# Patient Record
Sex: MALE | Race: WHITE | NOT HISPANIC OR LATINO | Employment: OTHER | URBAN - METROPOLITAN AREA
[De-identification: names, ages, dates, MRNs, and addresses within clinical notes are randomized per-mention and may not be internally consistent; named-entity substitution may affect disease eponyms.]

---

## 2018-08-29 ENCOUNTER — HOSPITAL ENCOUNTER (EMERGENCY)
Facility: HOSPITAL | Age: 73
Discharge: HOME | End: 2018-08-29
Attending: EMERGENCY MEDICINE | Admitting: EMERGENCY MEDICINE
Payer: COMMERCIAL

## 2018-08-29 ENCOUNTER — APPOINTMENT (EMERGENCY)
Dept: RADIOLOGY | Facility: HOSPITAL | Age: 73
End: 2018-08-29
Payer: COMMERCIAL

## 2018-08-29 VITALS
TEMPERATURE: 98.6 F | HEART RATE: 66 BPM | DIASTOLIC BLOOD PRESSURE: 97 MMHG | RESPIRATION RATE: 18 BRPM | SYSTOLIC BLOOD PRESSURE: 143 MMHG | HEIGHT: 72 IN | WEIGHT: 270 LBS | BODY MASS INDEX: 36.57 KG/M2 | OXYGEN SATURATION: 98 %
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
## 2018-08-29 DIAGNOSIS — S30.1XXA TRAUMATIC ECCHYMOSIS OF ABDOMINAL WALL, INITIAL ENCOUNTER: ICD-10-CM

## 2018-08-29 DIAGNOSIS — M79.622 LEFT UPPER ARM PAIN: ICD-10-CM

## 2018-08-29 DIAGNOSIS — R10.9 RIGHT SIDED ABDOMINAL PAIN: Primary | ICD-10-CM

## 2018-08-29 LAB
ANION GAP BLD CALC-SCNC: 18 MMOL/L (ref 4–13)
BUN BLD-MCNC: 14 MG/DL (ref 5–25)
CA-I BLD-SCNC: 1.13 MMOL/L (ref 1.12–1.32)
CHLORIDE BLD-SCNC: 103 MMOL/L (ref 100–108)
CREAT BLD-MCNC: 1 MG/DL (ref 0.6–1.3)
GFR SERPL CREATININE-BSD FRML MDRD: 75 ML/MIN/1.73SQ M
GLUCOSE SERPL-MCNC: 211 MG/DL (ref 65–140)
HCT VFR BLD CALC: 44 % (ref 36.5–49.3)
HGB BLDA-MCNC: 15 G/DL (ref 12–17)
PCO2 BLD: 24 MMOL/L (ref 21–32)
POTASSIUM BLD-SCNC: 4.1 MMOL/L (ref 3.5–5.3)
SODIUM BLD-SCNC: 140 MMOL/L (ref 136–145)
SPECIMEN SOURCE: ABNORMAL

## 2018-08-29 PROCEDURE — 73060 X-RAY EXAM OF HUMERUS: CPT

## 2018-08-29 PROCEDURE — 99285 EMERGENCY DEPT VISIT HI MDM: CPT

## 2018-08-29 PROCEDURE — 70450 CT HEAD/BRAIN W/O DYE: CPT

## 2018-08-29 PROCEDURE — 85014 HEMATOCRIT: CPT

## 2018-08-29 PROCEDURE — 74177 CT ABD & PELVIS W/CONTRAST: CPT

## 2018-08-29 PROCEDURE — 80047 BASIC METABLC PNL IONIZED CA: CPT

## 2018-08-29 RX ORDER — CLOPIDOGREL BISULFATE 75 MG/1
75 TABLET ORAL DAILY
COMMUNITY

## 2018-08-29 RX ADMIN — IOHEXOL 100 ML: 350 INJECTION, SOLUTION INTRAVENOUS at 10:40
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
## 2021-01-28 DIAGNOSIS — Z23 ENCOUNTER FOR IMMUNIZATION: ICD-10-CM

## 2022-09-02 ENCOUNTER — TELEPHONE (OUTPATIENT)
Dept: GASTROENTEROLOGY | Facility: CLINIC | Age: 77
End: 2022-09-02

## 2022-09-02 NOTE — TELEPHONE ENCOUNTER
Patients GI provider:  Pt seen Dr Jay more, Dr Ebenezer Saucedo and Dr Jeanie Nair    Number to return call: [phone])    Reason for call: Pt calling because he received letter to call and schedule his colonoscopy  Please call to schedule  Thank you!     Scheduled procedure/appointment date if applicable: Apt/procedure

## 2022-09-07 ENCOUNTER — TELEPHONE (OUTPATIENT)
Dept: GASTROENTEROLOGY | Facility: CLINIC | Age: 77
End: 2022-09-07

## 2022-09-07 ENCOUNTER — OFFICE VISIT (OUTPATIENT)
Dept: GASTROENTEROLOGY | Facility: CLINIC | Age: 77
End: 2022-09-07
Payer: MEDICARE

## 2022-09-07 VITALS
HEART RATE: 71 BPM | BODY MASS INDEX: 35.21 KG/M2 | HEIGHT: 72 IN | SYSTOLIC BLOOD PRESSURE: 118 MMHG | DIASTOLIC BLOOD PRESSURE: 78 MMHG | WEIGHT: 260 LBS

## 2022-09-07 DIAGNOSIS — K63.5 HYPERPLASTIC COLONIC POLYP, UNSPECIFIED PART OF COLON: Primary | ICD-10-CM

## 2022-09-07 DIAGNOSIS — I25.10 CORONARY ARTERY DISEASE INVOLVING NATIVE CORONARY ARTERY OF NATIVE HEART WITHOUT ANGINA PECTORIS: ICD-10-CM

## 2022-09-07 PROBLEM — T83.113A: Status: ACTIVE | Noted: 2022-09-07

## 2022-09-07 PROBLEM — T83.113A: Status: RESOLVED | Noted: 2022-09-07 | Resolved: 2022-09-07

## 2022-09-07 PROCEDURE — 99203 OFFICE O/P NEW LOW 30 MIN: CPT | Performed by: NURSE PRACTITIONER

## 2022-09-07 RX ORDER — ROSUVASTATIN CALCIUM 20 MG/1
TABLET, COATED ORAL
COMMUNITY
Start: 2022-08-17

## 2022-09-07 RX ORDER — LISINOPRIL 10 MG/1
TABLET ORAL
COMMUNITY
Start: 2009-09-06

## 2022-09-07 RX ORDER — ASPIRIN 81 MG/1
81 TABLET ORAL
COMMUNITY
End: 2022-09-07

## 2022-09-07 RX ORDER — CARVEDILOL 6.25 MG/1
TABLET ORAL
COMMUNITY
Start: 2022-08-25

## 2022-09-07 NOTE — LETTER
September 7, 2022     MD Reji Brody  [address] [address]    Patient: Yesika Abrams   YOB: 1945   Date of Visit: 9/7/2022       Dear Dr Lucas Jaimes: Thank you for referring Yesika Abrams to me for evaluation  Below are my notes for this consultation  If you have questions, please do not hesitate to call me  I look forward to following your patient along with you  Sincerely,        REMI Alfonso        CC: No Recipients  REMI Alfonso  9/7/2022  4:53 PM  Sign when Signing Visit    [address] [facility] - Outpatient Consultation  Yesika Abrams 68 y o  male MRN: [id number]  Encounter: [id number]    ASSESSMENT AND PLAN:      1  History of colon polyp  Prior colonoscopy 05/2017-multiple hyperplastic polyps excised  Prior colon polyps  5 year recall recommended, Overdue for surveillance  No recent acute change in bowel habits, no alarm symptoms  -scheduled for colonoscopy at [facility]    2  Coronary artery disease involving native coronary artery of native heart without angina pectoris  History of CAD with cardiac stents 9 years ago  Stable cardiac status  Denies recent angina or dyspnea  Currently takes clopidogrel 75 mg daily for anti-platelet therapy  Discussed risk of bleeding if colonoscopy performed on clopidogrel  Also discussed risks of thrombus formation with holding clopidogrel prior to colonoscopy  Patient understands and accepts risks  Has interrupted anti-platelet therapy previously without complication  -instructed patient to hold clopidogrel 5 days prior to colonoscopy  -will confer with patient's cardiologist to ensure no further recommendations or instructions needed prior to holding clopidogrel    Followup Appointment:  As needed  ______________________________________________________________________    Chief Complaint   Patient presents with    Clearance for colonoscopy   PT is on Plavix' HPI:   Haidee Schilder is a 68y o  year old male who presents to schedule surveillance colonoscopy  Prior colonoscopy 05/2017-3 hyperplastic polyps excised  Five year recall recommended/2022  Patient has prior history of colon polyp  He denies recent change in bowel habits-formed bowel movement daily  Denies abdominal or rectal pain  No melena or rectal bleeding    He has a history of CAD and reports remote cardiac stent 9 years ago  Denies recent chest pain, dyspnea or palpitations  Currently takes clopidogrel daily    Historical Information   Past Medical History:   Diagnosis Date    Cardiac disease     Colon polyp      Past Surgical History:   Procedure Laterality Date    COLONOSCOPY       Social History     Substance and Sexual Activity   Alcohol Use No     Social History     Substance and Sexual Activity   Drug Use No     Social History     Tobacco Use   Smoking Status Current Every Day Smoker    Packs/day: 0 50   Smokeless Tobacco Never Used     Family History   Problem Relation Age of Onset    Colon cancer Neg Hx     Colon polyps Neg Hx        Meds/Allergies     Current Outpatient Medications:     carvedilol (COREG) 6 25 mg tablet    clopidogrel (PLAVIX) 75 mg tablet    lisinopril (ZESTRIL) 10 mg tablet    metFORMIN (GLUCOPHAGE) 500 mg tablet    rosuvastatin (CRESTOR) 20 MG tablet    No Known Allergies    PHYSICAL EXAM:    Blood pressure 118/78, pulse 71, height 6' (1 829 m), weight 118 kg (260 lb)  Body mass index is 35 26 kg/m²  General Appearance: NAD, cooperative, alert  Eyes: Anicteric  ENT:  Normocephalic, atraumatic, normal mucosa  Neck:  Supple, symmetrical, trachea midline,   Resp:  Clear to auscultation bilaterally; no rales, rhonchi or wheezing; respirations unlabored   CV:  S1 S2, Regular rate and rhythm; no murmur, rub, or gallop    GI:  Soft, non-tender, non-distended; normal bowel sounds; no masses, no organomegaly   Rectal: Deferred  Musculoskeletal: No cyanosis, clubbing or edema  Normal ROM  Skin:  No jaundice, rashes, or lesions   Psych: Normal affect, good eye contact  Neuro: No gross deficits, AAOx3    Lab Results:   Lab Results   Component Value Date    HGB 15 0 08/29/2018    HCT 44 08/29/2018     Lab Results   Component Value Date    CO2 24 08/29/2018    GLUCOSE 211 (H) 08/29/2018    EGFR 75 08/29/2018           REVIEW OF SYSTEMS:    CONSTITUTIONAL: Denies any fever, chills, rigors, and weight loss  HEENT: No earache or tinnitus  Denies hearing loss or visual disturbances  CARDIOVASCULAR: No chest pain or palpitations  RESPIRATORY: Denies any cough, hemoptysis, shortness of breath or dyspnea on exertion  GASTROINTESTINAL: As noted in the History of Present Illness  GENITOURINARY: No problems with urination  Denies any hematuria or dysuria  NEUROLOGIC: No dizziness or vertigo, denies headaches  MUSCULOSKELETAL: Denies any muscle or joint pain  SKIN: Denies skin rashes or itching  ENDOCRINE: Denies excessive thirst  Denies intolerance to heat or cold  PSYCHOSOCIAL: Denies depression or anxiety  Denies any recent memory loss

## 2022-09-07 NOTE — PROGRESS NOTES
[address] [facility] - Outpatient Consultation  Shahla Prado 68 y o  male MRN: [id number]  Encounter: [id number]    ASSESSMENT AND PLAN:      1  History of colon polyp  Prior colonoscopy 05/2017-multiple hyperplastic polyps excised  Prior colon polyps  5 year recall recommended, Overdue for surveillance  No recent acute change in bowel habits, no alarm symptoms  -scheduled for colonoscopy at [facility]    2  Coronary artery disease involving native coronary artery of native heart without angina pectoris  History of CAD with cardiac stents 9 years ago  Stable cardiac status  Denies recent angina or dyspnea  Currently takes clopidogrel 75 mg daily for anti-platelet therapy  Discussed risk of bleeding if colonoscopy performed on clopidogrel  Also discussed risks of thrombus formation with holding clopidogrel prior to colonoscopy  Patient understands and accepts risks  Has interrupted anti-platelet therapy previously without complication  -instructed patient to hold clopidogrel 5 days prior to colonoscopy  -will confer with patient's cardiologist to ensure no further recommendations or instructions needed prior to holding clopidogrel    Followup Appointment:  As needed  ______________________________________________________________________    Chief Complaint   Patient presents with    Clearance for colonoscopy  PT is on Plavix'       HPI:   Shahla Prado is a 68y o  year old male who presents to schedule surveillance colonoscopy  Prior colonoscopy 05/2017-3 hyperplastic polyps excised  Five year recall recommended/2022  Patient has prior history of colon polyp  He denies recent change in bowel habits-formed bowel movement daily  Denies abdominal or rectal pain  No melena or rectal bleeding    He has a history of CAD and reports remote cardiac stent 9 years ago  Denies recent chest pain, dyspnea or palpitations    Currently takes clopidogrel daily    Historical Information Past Medical History:   Diagnosis Date    Cardiac disease     Colon polyp      Past Surgical History:   Procedure Laterality Date    COLONOSCOPY       Social History     Substance and Sexual Activity   Alcohol Use No     Social History     Substance and Sexual Activity   Drug Use No     Social History     Tobacco Use   Smoking Status Current Every Day Smoker    Packs/day: 0 50   Smokeless Tobacco Never Used     Family History   Problem Relation Age of Onset    Colon cancer Neg Hx     Colon polyps Neg Hx        Meds/Allergies     Current Outpatient Medications:     carvedilol (COREG) 6 25 mg tablet    clopidogrel (PLAVIX) 75 mg tablet    lisinopril (ZESTRIL) 10 mg tablet    metFORMIN (GLUCOPHAGE) 500 mg tablet    rosuvastatin (CRESTOR) 20 MG tablet    No Known Allergies    PHYSICAL EXAM:    Blood pressure 118/78, pulse 71, height 6' (1 829 m), weight 118 kg (260 lb)  Body mass index is 35 26 kg/m²  General Appearance: NAD, cooperative, alert  Eyes: Anicteric  ENT:  Normocephalic, atraumatic, normal mucosa  Neck:  Supple, symmetrical, trachea midline,   Resp:  Clear to auscultation bilaterally; no rales, rhonchi or wheezing; respirations unlabored   CV:  S1 S2, Regular rate and rhythm; no murmur, rub, or gallop  GI:  Soft, non-tender, non-distended; normal bowel sounds; no masses, no organomegaly   Rectal: Deferred  Musculoskeletal: No cyanosis, clubbing or edema  Normal ROM  Skin:  No jaundice, rashes, or lesions   Psych: Normal affect, good eye contact  Neuro: No gross deficits, AAOx3    Lab Results:   Lab Results   Component Value Date    HGB 15 0 08/29/2018    HCT 44 08/29/2018     Lab Results   Component Value Date    CO2 24 08/29/2018    GLUCOSE 211 (H) 08/29/2018    EGFR 75 08/29/2018           REVIEW OF SYSTEMS:    CONSTITUTIONAL: Denies any fever, chills, rigors, and weight loss  HEENT: No earache or tinnitus  Denies hearing loss or visual disturbances    CARDIOVASCULAR: No chest pain or palpitations  RESPIRATORY: Denies any cough, hemoptysis, shortness of breath or dyspnea on exertion  GASTROINTESTINAL: As noted in the History of Present Illness  GENITOURINARY: No problems with urination  Denies any hematuria or dysuria  NEUROLOGIC: No dizziness or vertigo, denies headaches  MUSCULOSKELETAL: Denies any muscle or joint pain  SKIN: Denies skin rashes or itching  ENDOCRINE: Denies excessive thirst  Denies intolerance to heat or cold  PSYCHOSOCIAL: Denies depression or anxiety  Denies any recent memory loss

## 2022-09-07 NOTE — TELEPHONE ENCOUNTER
Scheduled date of colonoscopy (as of today):10/14//22  Physician performing colonoscopy:Dr Tiffanie Olson  Location of colonoscopy:[facility]  Bowel prep reviewed with patient:Jackie  Instructions reviewed with patient by: Elis Shaffer  Clearances: Yes-Plavix

## 2022-09-19 NOTE — TELEPHONE ENCOUNTER
Clearance received and scanned to chart  LVM advising patient of receipt of clearance for 5 day Plavix hold  Last dose will be 10/8/22  Asked that he call to confirm receipt

## 2022-10-03 ENCOUNTER — TELEPHONE (OUTPATIENT)
Dept: GASTROENTEROLOGY | Facility: CLINIC | Age: 77
End: 2022-10-03

## 2022-10-03 NOTE — TELEPHONE ENCOUNTER
Patients GI provider:  Dr Chelsi Ortega    Number to return call: [phone]    Reason for call: Pt calling to reschedule his procedure to the following week if possible      Scheduled procedure/appointment date if applicable: Apt/procedure 10/14/22

## 2022-10-20 ENCOUNTER — TELEPHONE (OUTPATIENT)
Dept: GASTROENTEROLOGY | Facility: CLINIC | Age: 77
End: 2022-10-20

## 2022-10-20 NOTE — TELEPHONE ENCOUNTER
Left message for patient to call back to go over golyte instructions for 10/31 procedure  Also remind him to stop Plavix 5 days prior to procedure

## 2022-10-27 ENCOUNTER — NURSE TRIAGE (OUTPATIENT)
Dept: OTHER | Facility: OTHER | Age: 77
End: 2022-10-27

## 2022-10-27 NOTE — TELEPHONE ENCOUNTER
Left message with Babs Parrish regarding NPO after 11:00am morning of the procedure    Spoke to Gerhardt Elam at [facility] and was instructed to tell patient he is able to have liquids until 11:00am

## 2022-10-27 NOTE — TELEPHONE ENCOUNTER
Patient's spouse Achilles Holstein called in to question patient's NPO status  If patient's procedure is scheduled for 3 PM and he has to complete bowel prep from 10-11 AM morning of procedure  Jesse Holstein wants to know why the patient cannot have other liquid (water) to drink form midnight to 10 AM day of procedure  Please follow up with Achilles Holstein  Reason for Disposition  • [1] Caller has NON-URGENT question AND [2] triager unable to answer question    Answer Assessment - Initial Assessment Questions  1  DATE/TIME: "When did you have your colonoscopy?"       Scheduled 10/31/22    2  MAIN CONCERN: "What is your main concern right now?" "What questions do you have?"     NPO status      Protocols used: COLONOSCOPY SYMPTOMS AND QUESTIONS-ADULT-

## 2022-10-27 NOTE — TELEPHONE ENCOUNTER
Regarding: Colonoscopy Prep Questions  ----- Message from [employer] Query sent at 10/27/2022 11:36 AM EDT -----  "I have some questions and concerns in regards to my 's colonoscopy prep "

## 2022-10-31 ENCOUNTER — ANESTHESIA (OUTPATIENT)
Dept: GASTROENTEROLOGY | Facility: AMBULATORY SURGERY CENTER | Age: 77
End: 2022-10-31

## 2022-10-31 ENCOUNTER — ANESTHESIA EVENT (OUTPATIENT)
Dept: GASTROENTEROLOGY | Facility: AMBULATORY SURGERY CENTER | Age: 77
End: 2022-10-31

## 2022-10-31 ENCOUNTER — HOSPITAL ENCOUNTER (OUTPATIENT)
Dept: GASTROENTEROLOGY | Facility: AMBULATORY SURGERY CENTER | Age: 77
Discharge: HOME | End: 2022-10-31

## 2022-10-31 VITALS
TEMPERATURE: 98 F | RESPIRATION RATE: 19 BRPM | OXYGEN SATURATION: 98 % | DIASTOLIC BLOOD PRESSURE: 75 MMHG | HEIGHT: 72 IN | SYSTOLIC BLOOD PRESSURE: 132 MMHG | HEART RATE: 60 BPM | BODY MASS INDEX: 35.21 KG/M2 | WEIGHT: 260 LBS

## 2022-10-31 DIAGNOSIS — K63.5 HYPERPLASTIC COLONIC POLYP, UNSPECIFIED PART OF COLON: ICD-10-CM

## 2022-10-31 RX ORDER — SODIUM CHLORIDE, SODIUM LACTATE, POTASSIUM CHLORIDE, CALCIUM CHLORIDE 600; 310; 30; 20 MG/100ML; MG/100ML; MG/100ML; MG/100ML
50 INJECTION, SOLUTION INTRAVENOUS CONTINUOUS
Status: DISCONTINUED | OUTPATIENT
Start: 2022-10-31 | End: 2022-11-04 | Stop reason: HOSPADM

## 2022-10-31 RX ORDER — LIDOCAINE HYDROCHLORIDE 10 MG/ML
INJECTION, SOLUTION EPIDURAL; INFILTRATION; INTRACAUDAL; PERINEURAL AS NEEDED
Status: DISCONTINUED | OUTPATIENT
Start: 2022-10-31 | End: 2022-10-31

## 2022-10-31 RX ORDER — PROPOFOL 10 MG/ML
INJECTION, EMULSION INTRAVENOUS AS NEEDED
Status: DISCONTINUED | OUTPATIENT
Start: 2022-10-31 | End: 2022-10-31

## 2022-10-31 RX ADMIN — PROPOFOL 30 MG: 10 INJECTION, EMULSION INTRAVENOUS at 15:54

## 2022-10-31 RX ADMIN — PROPOFOL 30 MG: 10 INJECTION, EMULSION INTRAVENOUS at 15:47

## 2022-10-31 RX ADMIN — PROPOFOL 20 MG: 10 INJECTION, EMULSION INTRAVENOUS at 15:24

## 2022-10-31 RX ADMIN — PROPOFOL 50 MG: 10 INJECTION, EMULSION INTRAVENOUS at 15:52

## 2022-10-31 RX ADMIN — PROPOFOL 50 MG: 10 INJECTION, EMULSION INTRAVENOUS at 15:26

## 2022-10-31 RX ADMIN — LIDOCAINE HYDROCHLORIDE 50 MG: 10 INJECTION, SOLUTION EPIDURAL; INFILTRATION; INTRACAUDAL; PERINEURAL at 15:21

## 2022-10-31 RX ADMIN — PROPOFOL 50 MG: 10 INJECTION, EMULSION INTRAVENOUS at 15:41

## 2022-10-31 RX ADMIN — PROPOFOL 40 MG: 10 INJECTION, EMULSION INTRAVENOUS at 15:37

## 2022-10-31 RX ADMIN — PROPOFOL 40 MG: 10 INJECTION, EMULSION INTRAVENOUS at 15:29

## 2022-10-31 RX ADMIN — PROPOFOL 30 MG: 10 INJECTION, EMULSION INTRAVENOUS at 15:23

## 2022-10-31 RX ADMIN — PROPOFOL 100 MG: 10 INJECTION, EMULSION INTRAVENOUS at 15:21

## 2022-10-31 RX ADMIN — SODIUM CHLORIDE, SODIUM LACTATE, POTASSIUM CHLORIDE, CALCIUM CHLORIDE 50 ML/HR: 600; 310; 30; 20 INJECTION, SOLUTION INTRAVENOUS at 15:06

## 2022-10-31 RX ADMIN — PROPOFOL 40 MG: 10 INJECTION, EMULSION INTRAVENOUS at 15:34

## 2022-10-31 NOTE — H&P
History and Physical - [facility]  Trudy Cee 68 y o  male MRN: [id number]    HPI: Trudy Cee is a 68y o  year old male who presents for surveillance colonoscopy  His personal history of colon polyps    REVIEW OF SYSTEMS: Per the HPI, and otherwise unremarkable      Historical Information   Past Medical History:   Diagnosis Date   • Cardiac disease    • Colon polyp    • Diabetes mellitus ([facility] [address] )    • Heart attack ([facility] [address] )    • Hyperlipidemia    • Hypertension      Past Surgical History:   Procedure Laterality Date   • ABDOMINAL AORTIC ANEURYSM REPAIR     • COLONOSCOPY     • CORONARY ANGIOPLASTY WITH STENT PLACEMENT     • JOINT REPLACEMENT     • REPLACEMENT TOTAL HIP W/  RESURFACING IMPLANTS Bilateral    • REPLACEMENT TOTAL KNEE       Social History   Social History     Substance and Sexual Activity   Alcohol Use No     Social History     Substance and Sexual Activity   Drug Use No     Social History     Tobacco Use   Smoking Status Current Every Day Smoker   • Packs/day: 0 50   Smokeless Tobacco Never Used     Family History   Problem Relation Age of Onset   • Colon cancer Neg Hx    • Colon polyps Neg Hx        Meds/Allergies       Current Outpatient Medications:   •  carvedilol (COREG) 6 25 mg tablet  •  clopidogrel (PLAVIX) 75 mg tablet  •  lisinopril (ZESTRIL) 10 mg tablet  •  metFORMIN (GLUCOPHAGE) 500 mg tablet  •  polyethylene glycol (GOLYTELY) 4000 mL solution  •  rosuvastatin (CRESTOR) 20 MG tablet    Current Facility-Administered Medications:   •  lactated ringers infusion, 50 mL/hr, Intravenous, Continuous, Continue from Pre-op at 10/31/22 1519    No Known Allergies    Objective     /79   Pulse 69   Temp 98 °F (36 7 °C) (Temporal)   Resp 19   Ht 6' (1 829 m)   Wt 118 kg (260 lb)   SpO2 96%   BMI 35 26 kg/m²     PHYSICAL EXAM    Gen: NAD AAOx3  Head: Normocephalic, Atraumatic  CV: S1S2 RRR no m/r/g  CHEST: Clear b/l no c/r/w  ABD: soft, +BS NT/ND  EXT: no edema    ASSESSMENT/PLAN:  This is a 68y o  year old male here for surveillance colonoscopy, and he is stable and optimized for his procedure

## 2022-10-31 NOTE — ANESTHESIA POSTPROCEDURE EVALUATION
Post-Op Assessment Note    CV Status:  Stable  Pain Score: 0    Pain management: adequate     Mental Status:  Alert and awake   Hydration Status:  Stable and euvolemic   PONV Controlled:  Controlled   Airway Patency:  Patent      Post Op Vitals Reviewed: Yes      Staff: CRNA         No complications documented      BP   120/66   Temp      Pulse  68   Resp   15   SpO2   98

## 2022-10-31 NOTE — ANESTHESIA PREPROCEDURE EVALUATION
Procedure:  COLONOSCOPY    Relevant Problems   ANESTHESIA (within normal limits)      CARDIO  PVD s/p AAA repair    Per PCP note last EF 50%  Plays tennis/golfs without angina   (+) Coronary artery disease involving native coronary artery   (+) Heart attack (HCC)   (+) Hyperlipidemia   (+) Hypertension      PULMONARY   (+) Smoking   (-) Sleep apnea   (-) URI (upper respiratory infection)      Endocrine   (+) Diabetes mellitus (HCC)    BMI 35    Physical Exam    Airway    Mallampati score: II  TM Distance: >3 FB  Neck ROM: full     Dental   No notable dental hx     Cardiovascular      Pulmonary      Other Findings         Anesthesia Plan  ASA Score- 3     Anesthesia Type- IV sedation with anesthesia with ASA Monitors  Additional Monitors:   Airway Plan:           Plan Factors-Exercise tolerance (METS): >4 METS  Chart reviewed  Existing labs reviewed  Patient summary reviewed  Patient is a current smoker  Induction- intravenous  Postoperative Plan-     Informed Consent- Anesthetic plan and risks discussed with patient  I personally reviewed this patient with the CRNA  Discussed and agreed on the Anesthesia Plan with the CRNA  Oscar Adame

## 2023-02-23 ENCOUNTER — TELEPHONE (OUTPATIENT)
Dept: GASTROENTEROLOGY | Facility: CLINIC | Age: 78
End: 2023-02-23

## 2023-02-23 NOTE — TELEPHONE ENCOUNTER
Patients GI provider:  Lady Marshall    Number to return call: [phone]    Reason for call: Christianne calling from Dr Seth Armstrong office of [facility] in regards to obtaining pt's last colonoscopy report  Christianne can be reached at above number and fax is [phone]      Scheduled procedure/appointment date if applicable: N/A

## 2024-06-06 ENCOUNTER — OFFICE VISIT (OUTPATIENT)
Dept: GASTROENTEROLOGY | Facility: CLINIC | Age: 79
End: 2024-06-06
Payer: MEDICARE

## 2024-06-06 VITALS
DIASTOLIC BLOOD PRESSURE: 74 MMHG | SYSTOLIC BLOOD PRESSURE: 118 MMHG | BODY MASS INDEX: 34.4 KG/M2 | HEIGHT: 72 IN | WEIGHT: 254 LBS

## 2024-06-06 DIAGNOSIS — K62.5 RECTAL BLEEDING: ICD-10-CM

## 2024-06-06 DIAGNOSIS — K64.1 GRADE II HEMORRHOIDS: Primary | ICD-10-CM

## 2024-06-06 PROCEDURE — 99213 OFFICE O/P EST LOW 20 MIN: CPT | Performed by: NURSE PRACTITIONER

## 2024-06-06 RX ORDER — DAPAGLIFLOZIN 10 MG/1
TABLET, FILM COATED ORAL
COMMUNITY
Start: 2024-01-15

## 2024-06-06 RX ORDER — ROSUVASTATIN CALCIUM 20 MG/1
TABLET, COATED ORAL
COMMUNITY
Start: 2022-04-18

## 2024-06-06 NOTE — PROGRESS NOTES
[facility] - Outpatient Follow-up Note  Reza Rogers 78 y.o. male MRN: [id number]  Encounter: [id number]    ASSESSMENT AND PLAN:      1.  Rectal bleeding  2.  Rectal pain  3.  Thrombosed hemorrhoid  Patient with known grade 2 internal hemorrhoids on colonoscopy 2022  Presents today with 7-day history of rectal pain with prolapsed hemorrhoidal tissue  3 to 4-day history of bright red blood noticed with wiping after bowel movement  Noted to have thrombosed hemorrhoid on exam today which appears to be healing but still enlarged  -Referred to Dr. Crowell for further evaluation and treatment.  Appointment scheduled for tomorrow 6/7  -Instructed patient to use warm soaks frequently for comfort and to aid healing  -Okay to use OTC hemorrhoid cream 2-3 times per day as needed  -Can follow-up in our office in 2 months for possible hemorrhoid banding if indicated    4.  History of colon polyps  Prior [id number] which showed medium grade 2 internal hemorrhoids.  Patient also underwent excision of 6 adenomatous polyps  No recall recommended due to patient's age however patient is requesting continued surveillance  Recall recommended in 5 years/October 2027    5.  CAD  History of CAD with prior cardiac stents 2015  Currently takes clopidogrel 75 mg daily for antiplatelet therapy    Followup Appointment: 2 months for possible hemorrhoid banding  ______________________________________________________________________    Chief Complaint   Patient presents with    Hemorrhoids     HPI: Patient with known hemorrhoids-underwent colonoscopy 10/2022 which showed medium grade 2 internal hemorrhoids.  Patient also underwent excision of 6 adenomatous polyps    Presents today with 10-day history of prolapsing hemorrhoids and rectal bleeding.  Patient denies severe rectal pain but does have mild soreness/discomfort developed 3-day history of rectal bleeding with bright red blood noted with wiping  after bowel movement.  Last episode was yesterday    Denies recent acute change in bowel habits-reports formed bowel movement every 1 to 2 days.  Denies constipation or straining to defecate.  No recent melena    No history of prior hemorrhoid treatments or surgery    Historical Information   Past Medical History:   Diagnosis Date    AAA (abdominal aortic aneurysm) (HCC)     Cardiac disease     Colon polyp     Coronary artery disease     Diabetes mellitus (HCC)     Heart attack (HCC)     Hyperlipidemia     Hypertension      Past Surgical History:   Procedure Laterality Date    ABDOMINAL AORTIC ANEURYSM REPAIR      COLONOSCOPY      CORONARY ANGIOPLASTY WITH STENT PLACEMENT      JOINT REPLACEMENT      REPLACEMENT TOTAL HIP W/  RESURFACING IMPLANTS Bilateral     REPLACEMENT TOTAL KNEE       Social History     Substance and Sexual Activity   Alcohol Use No     Social History     Substance and Sexual Activity   Drug Use No     Social History     Tobacco Use   Smoking Status Every Day    Current packs/day: 0.50    Types: Cigarettes   Smokeless Tobacco Never     Family History   Problem Relation Age of Onset    Colon cancer Neg Hx     Colon polyps Neg Hx          Current Outpatient Medications:     carvedilol (COREG) 6.25 mg tablet    clopidogrel (PLAVIX) 75 mg tablet    Farxiga 10 MG tablet    lisinopril (ZESTRIL) 10 mg tablet    rosuvastatin (Crestor) 20 MG tablet    metFORMIN (GLUCOPHAGE) 500 mg tablet    rosuvastatin (CRESTOR) 20 MG tablet  No Known Allergies  Reviewed medications and allergies and updated as indicated    PHYSICAL EXAM:    Blood pressure 118/74, height 6' (1.829 m), weight 115 kg (254 lb). Body mass index is 34.45 kg/m².  General Appearance: NAD, cooperative, alert  Eyes: Anicteric  ENT:  Normocephalic, atraumatic, normal mucosa.    Neck:  Supple, symmetrical, trachea midline  Resp:  Clear to auscultation bilaterally; no rales, rhonchi or wheezing; respirations unlabored   CV:  S1 S2, Regular rate and  rhythm; no murmur, rub, or gallop.  GI:  Soft, non-tender, non-distended; normal bowel sounds; no masses, no organomegaly   Rectal: Noted to have prolapsed thrombosed hemorrhoid at right posterior bundle, no active bleeding, appears to be healing  Musculoskeletal: No cyanosis, clubbing or edema. Normal ROM.  Skin:  No jaundice, rashes, or lesions   Psych: Normal affect, good eye contact  Neuro: No gross deficits, AAOx3    Lab Results:   Lab Results   Component Value Date    HGB 15.0 08/29/2018    HCT 44 08/29/2018     Lab Results   Component Value Date    K 4.7 06/30/2022     (H) 06/30/2022    CO2 27 06/30/2022    BUN 20 06/30/2022    CREATININE 1.10 06/30/2022    GLUCOSE 211 (H) 08/29/2018    CALCIUM 9.6 06/30/2022    EGFR 70 06/30/2022

## 2025-08-04 DIAGNOSIS — E11.69 MIXED DIABETIC HYPERLIPIDEMIA ASSOCIATED WITH TYPE 2 DIABETES MELLITUS  (HCC): ICD-10-CM

## 2025-08-04 DIAGNOSIS — E11.69 MIXED DIABETIC HYPERLIPIDEMIA ASSOCIATED WITH TYPE 2 DIABETES MELLITUS  (HCC): Primary | ICD-10-CM

## 2025-08-04 DIAGNOSIS — E78.2 MIXED DIABETIC HYPERLIPIDEMIA ASSOCIATED WITH TYPE 2 DIABETES MELLITUS  (HCC): ICD-10-CM

## 2025-08-04 DIAGNOSIS — E78.2 MIXED DIABETIC HYPERLIPIDEMIA ASSOCIATED WITH TYPE 2 DIABETES MELLITUS  (HCC): Primary | ICD-10-CM

## 2025-08-04 RX ORDER — DAPAGLIFLOZIN 10 MG/1
10 TABLET, FILM COATED ORAL DAILY
Qty: 90 TABLET | Refills: 3 | Status: SHIPPED | OUTPATIENT
Start: 2025-08-04 | End: 2025-08-04 | Stop reason: SDUPTHER

## 2025-08-04 RX ORDER — DAPAGLIFLOZIN 10 MG/1
10 TABLET, FILM COATED ORAL DAILY
Qty: 90 TABLET | Refills: 3 | Status: SHIPPED | OUTPATIENT
Start: 2025-08-04

## 2025-08-11 PROBLEM — E11.59: Status: ACTIVE | Noted: 2025-08-11

## 2025-08-11 PROBLEM — Z72.0 TOBACCO ABUSE: Status: ACTIVE | Noted: 2025-08-11

## 2025-08-11 PROBLEM — I11.9 HYPERTENSIVE HEART DISEASE WITHOUT CHF: Status: ACTIVE | Noted: 2025-08-11

## 2025-08-11 PROBLEM — K86.9 LESION OF PANCREAS: Status: ACTIVE | Noted: 2025-08-11

## 2025-08-11 PROBLEM — E66.9 OBESITY (BMI 30-39.9): Status: ACTIVE | Noted: 2025-08-11

## 2025-08-11 PROBLEM — Z77.018 HEAVY METAL EXPOSURE: Status: ACTIVE | Noted: 2025-08-11

## 2025-08-11 PROBLEM — H93.19 TINNITUS: Status: ACTIVE | Noted: 2025-08-11

## 2025-08-11 PROBLEM — I25.10: Status: ACTIVE | Noted: 2025-08-11

## 2025-08-11 PROBLEM — J44.9 COPD (CHRONIC OBSTRUCTIVE PULMONARY DISEASE) (HCC): Status: ACTIVE | Noted: 2025-08-11

## 2025-08-11 PROBLEM — M47.816 LUMBAR FACET ARTHROPATHY: Status: ACTIVE | Noted: 2025-08-11

## 2025-08-11 PROBLEM — I71.40 ABDOMINAL AORTIC ANEURYSM (AAA) (HCC): Status: ACTIVE | Noted: 2025-08-11

## 2025-08-11 PROBLEM — M48.062 SPINAL STENOSIS OF LUMBAR REGION WITH NEUROGENIC CLAUDICATION: Status: ACTIVE | Noted: 2025-08-11

## 2025-08-11 PROBLEM — M47.817 LUMBAR AND SACRAL OSTEOARTHRITIS: Status: ACTIVE | Noted: 2025-08-11

## 2025-08-11 PROBLEM — I25.5 ISCHEMIC CARDIOMYOPATHY: Status: ACTIVE | Noted: 2025-08-11
